# Patient Record
Sex: MALE | Race: OTHER | URBAN - METROPOLITAN AREA
[De-identification: names, ages, dates, MRNs, and addresses within clinical notes are randomized per-mention and may not be internally consistent; named-entity substitution may affect disease eponyms.]

---

## 2023-12-21 ENCOUNTER — OFFICE VISIT (OUTPATIENT)
Dept: ORTHOPEDICS CLINIC | Facility: CLINIC | Age: 53
End: 2023-12-21
Payer: MEDICAID

## 2023-12-21 ENCOUNTER — HOSPITAL ENCOUNTER (OUTPATIENT)
Dept: GENERAL RADIOLOGY | Facility: HOSPITAL | Age: 53
Discharge: HOME | End: 2023-12-21
Attending: ORTHOPAEDIC SURGERY
Payer: MEDICAID

## 2023-12-21 VITALS — BODY MASS INDEX: 30.36 KG/M2 | HEIGHT: 69 IN | WEIGHT: 205 LBS

## 2023-12-21 DIAGNOSIS — M25.512 CHRONIC LEFT SHOULDER PAIN: ICD-10-CM

## 2023-12-21 DIAGNOSIS — M75.02 ADHESIVE CAPSULITIS OF LEFT SHOULDER: Primary | ICD-10-CM

## 2023-12-21 DIAGNOSIS — G89.29 CHRONIC LEFT SHOULDER PAIN: ICD-10-CM

## 2023-12-21 PROCEDURE — 3008F BODY MASS INDEX DOCD: CPT | Performed by: ORTHOPAEDIC SURGERY

## 2023-12-21 PROCEDURE — 99204 OFFICE O/P NEW MOD 45 MIN: CPT | Performed by: ORTHOPAEDIC SURGERY

## 2023-12-21 PROCEDURE — 73030 X-RAY EXAM OF SHOULDER: CPT | Performed by: ORTHOPAEDIC SURGERY

## 2023-12-21 RX ORDER — IBUPROFEN 800 MG/1
800 TABLET ORAL 3 TIMES DAILY
COMMUNITY
Start: 2023-09-12

## 2023-12-21 RX ORDER — METHYLPREDNISOLONE 4 MG/1
TABLET ORAL
Qty: 1 EACH | Refills: 0 | Status: SHIPPED | OUTPATIENT
Start: 2023-12-21

## 2024-03-09 ENCOUNTER — HOSPITAL ENCOUNTER (EMERGENCY)
Facility: HOSPITAL | Age: 54
Discharge: HOME | End: 2024-03-10
Attending: EMERGENCY MEDICINE
Payer: MEDICAID

## 2024-03-09 DIAGNOSIS — K63.89 COLONIC MASS: Primary | ICD-10-CM

## 2024-03-09 DIAGNOSIS — K76.9 LESION OF LIVER: ICD-10-CM

## 2024-03-09 DIAGNOSIS — R10.2 PELVIC PAIN: ICD-10-CM

## 2024-03-09 LAB
ALBUMIN SERPL-MCNC: 4.8 G/DL (ref 3.2–4.8)
ALBUMIN/GLOB SERPL: 1.5 {RATIO} (ref 1–2)
ALP LIVER SERPL-CCNC: 79 U/L
ALT SERPL-CCNC: 29 U/L
ANION GAP SERPL CALC-SCNC: 8 MMOL/L (ref 0–18)
AST SERPL-CCNC: 23 U/L (ref ?–34)
BASOPHILS # BLD AUTO: 0.01 X10(3) UL (ref 0–0.2)
BASOPHILS NFR BLD AUTO: 0.1 %
BILIRUB SERPL-MCNC: 0.3 MG/DL (ref 0.3–1.2)
BUN BLD-MCNC: 13 MG/DL (ref 9–23)
BUN/CREAT SERPL: 12.5 (ref 10–20)
CALCIUM BLD-MCNC: 9.7 MG/DL (ref 8.7–10.4)
CHLORIDE SERPL-SCNC: 108 MMOL/L (ref 98–112)
CO2 SERPL-SCNC: 27 MMOL/L (ref 21–32)
CREAT BLD-MCNC: 1.04 MG/DL
DEPRECATED RDW RBC AUTO: 41.1 FL (ref 35.1–46.3)
EGFRCR SERPLBLD CKD-EPI 2021: 85 ML/MIN/1.73M2 (ref 60–?)
EOSINOPHIL # BLD AUTO: 0.15 X10(3) UL (ref 0–0.7)
EOSINOPHIL NFR BLD AUTO: 1.5 %
ERYTHROCYTE [DISTWIDTH] IN BLOOD BY AUTOMATED COUNT: 11.9 % (ref 11–15)
GLOBULIN PLAS-MCNC: 3.1 G/DL (ref 2.8–4.4)
GLUCOSE BLD-MCNC: 131 MG/DL (ref 70–99)
HCT VFR BLD AUTO: 47.2 %
HGB BLD-MCNC: 16.2 G/DL
IMM GRANULOCYTES # BLD AUTO: 0.03 X10(3) UL (ref 0–1)
IMM GRANULOCYTES NFR BLD: 0.3 %
LIPASE SERPL-CCNC: 53 U/L (ref 13–75)
LYMPHOCYTES # BLD AUTO: 2.34 X10(3) UL (ref 1–4)
LYMPHOCYTES NFR BLD AUTO: 23.6 %
MCH RBC QN AUTO: 31.9 PG (ref 26–34)
MCHC RBC AUTO-ENTMCNC: 34.3 G/DL (ref 31–37)
MCV RBC AUTO: 92.9 FL
MONOCYTES # BLD AUTO: 0.43 X10(3) UL (ref 0.1–1)
MONOCYTES NFR BLD AUTO: 4.3 %
NEUTROPHILS # BLD AUTO: 6.95 X10 (3) UL (ref 1.5–7.7)
NEUTROPHILS # BLD AUTO: 6.95 X10(3) UL (ref 1.5–7.7)
NEUTROPHILS NFR BLD AUTO: 70.2 %
OSMOLALITY SERPL CALC.SUM OF ELEC: 298 MOSM/KG (ref 275–295)
PLATELET # BLD AUTO: 258 10(3)UL (ref 150–450)
POTASSIUM SERPL-SCNC: 4 MMOL/L (ref 3.5–5.1)
PROT SERPL-MCNC: 7.9 G/DL (ref 5.7–8.2)
RBC # BLD AUTO: 5.08 X10(6)UL
SODIUM SERPL-SCNC: 143 MMOL/L (ref 136–145)
WBC # BLD AUTO: 9.9 X10(3) UL (ref 4–11)

## 2024-03-09 PROCEDURE — 80053 COMPREHEN METABOLIC PANEL: CPT | Performed by: EMERGENCY MEDICINE

## 2024-03-09 PROCEDURE — 85025 COMPLETE CBC W/AUTO DIFF WBC: CPT | Performed by: EMERGENCY MEDICINE

## 2024-03-09 PROCEDURE — 96375 TX/PRO/DX INJ NEW DRUG ADDON: CPT

## 2024-03-09 PROCEDURE — 83690 ASSAY OF LIPASE: CPT | Performed by: EMERGENCY MEDICINE

## 2024-03-09 PROCEDURE — 96374 THER/PROPH/DIAG INJ IV PUSH: CPT

## 2024-03-09 PROCEDURE — 99285 EMERGENCY DEPT VISIT HI MDM: CPT

## 2024-03-09 PROCEDURE — 99284 EMERGENCY DEPT VISIT MOD MDM: CPT

## 2024-03-09 RX ORDER — ONDANSETRON 4 MG/1
4 TABLET, ORALLY DISINTEGRATING ORAL ONCE
Status: DISCONTINUED | OUTPATIENT
Start: 2024-03-09 | End: 2024-03-09

## 2024-03-09 RX ORDER — ONDANSETRON 2 MG/ML
4 INJECTION INTRAMUSCULAR; INTRAVENOUS ONCE
Status: COMPLETED | OUTPATIENT
Start: 2024-03-09 | End: 2024-03-09

## 2024-03-09 RX ORDER — MORPHINE SULFATE 4 MG/ML
4 INJECTION, SOLUTION INTRAMUSCULAR; INTRAVENOUS ONCE
Status: COMPLETED | OUTPATIENT
Start: 2024-03-09 | End: 2024-03-09

## 2024-03-10 ENCOUNTER — APPOINTMENT (OUTPATIENT)
Dept: CT IMAGING | Facility: HOSPITAL | Age: 54
End: 2024-03-10
Attending: EMERGENCY MEDICINE
Payer: MEDICAID

## 2024-03-10 VITALS
SYSTOLIC BLOOD PRESSURE: 132 MMHG | WEIGHT: 200 LBS | BODY MASS INDEX: 31.39 KG/M2 | OXYGEN SATURATION: 99 % | HEIGHT: 66.93 IN | RESPIRATION RATE: 20 BRPM | HEART RATE: 74 BPM | TEMPERATURE: 98 F | DIASTOLIC BLOOD PRESSURE: 82 MMHG

## 2024-03-10 PROCEDURE — 74176 CT ABD & PELVIS W/O CONTRAST: CPT | Performed by: EMERGENCY MEDICINE

## 2024-03-10 RX ORDER — HYDROCODONE BITARTRATE AND ACETAMINOPHEN 5; 325 MG/1; MG/1
1 TABLET ORAL ONCE
Status: COMPLETED | OUTPATIENT
Start: 2024-03-10 | End: 2024-03-10

## 2024-03-10 RX ORDER — TRAMADOL HYDROCHLORIDE 50 MG/1
TABLET ORAL EVERY 6 HOURS PRN
Qty: 10 TABLET | Refills: 0 | Status: SHIPPED | OUTPATIENT
Start: 2024-03-10 | End: 2024-03-15

## 2024-03-10 NOTE — ED PROVIDER NOTES
Patient Seen in: [facility] Emergency Department    History     Chief Complaint   Patient presents with    Abdomen/Flank Pain       HPI    53-year-old male presents ER with complaint of suprapubic abdominal pain.  Patient states the pain started approximately 6 PM.  Patient states he had dinner and had 3 beers and started to have nausea as well as suprapubic pain.  Patient also complains of mild left flank pain.    History reviewed. History reviewed. No pertinent past medical history.    History reviewed. History reviewed. No pertinent surgical history.      Medications :  (Not in a hospital admission)       No family history on file.    Smoking Status:   Social History     Socioeconomic History    Marital status:    Tobacco Use    Smoking status: Former     Types: Cigarettes    Smokeless tobacco: Never   Vaping Use    Vaping Use: Never used   Substance and Sexual Activity    Alcohol use: Yes    Drug use: Not Currently       ROS  All pertinent positives for the review of systems are mentioned in the HPI  All other organ systems are reviewed and are negative.    Constitutional and vital signs reviewed.      Social History and Family History elements reviewed from today, pertinent positives to the presenting problem noted.    Physical Exam     ED Triage Vitals [03/09/24 2309]   BP (!) 181/103   Pulse 64   Resp 22   Temp 98.1 °F (36.7 °C)   Temp src Oral   SpO2 97 %   O2 Device None (Room air)       All measures to prevent infection transmission during my interaction with the patient were taken. The patient was already wearing a droplet mask on my arrival to the room. Personal protective equipment including droplet mask, eye protection, and gloves were worn throughout the duration of the exam.  Handwashing was performed prior to and after the exam.  Stethoscope and any equipment used during my examination was cleaned with super sani-cloth germicidal wipes following the exam.     Physical Exam  Vitals and  nursing note reviewed.   Constitutional:       Appearance: He is well-developed.   HENT:      Head: Normocephalic and atraumatic.      Right Ear: External ear normal.      Left Ear: External ear normal.      Nose: Nose normal.   Eyes:      Conjunctiva/sclera: Conjunctivae normal.      Pupils: Pupils are equal, round, and reactive to light.   Cardiovascular:      Rate and Rhythm: Normal rate and regular rhythm.      Heart sounds: Normal heart sounds.   Pulmonary:      Effort: Pulmonary effort is normal.      Breath sounds: Normal breath sounds.   Abdominal:      General: Bowel sounds are normal.      Palpations: Abdomen is soft.      Tenderness: There is abdominal tenderness in the suprapubic area. There is no right CVA tenderness, left CVA tenderness, guarding or rebound.   Musculoskeletal:         General: Normal range of motion.      Cervical back: Normal range of motion and neck supple.   Skin:     General: Skin is warm and dry.   Neurological:      Mental Status: He is alert and oriented to person, place, and time.      Deep Tendon Reflexes: Reflexes are normal and symmetric.   Psychiatric:         Behavior: Behavior normal.         Thought Content: Thought content normal.         Judgment: Judgment normal.         ED Course        Labs Reviewed   COMP METABOLIC PANEL (14) - Abnormal; Notable for the following components:       Result Value    Glucose 131 (*)     Calculated Osmolality 298 (*)     All other components within normal limits   LIPASE - Normal   CBC WITH DIFFERENTIAL WITH PLATELET    Narrative:     The following orders were created for panel order CBC With Differential With Platelet.                  Procedure                               Abnormality         Status                                     ---------                               -----------         ------                                     CBC W/ DIFFERENTIAL[[id number]]                              Final result                                                  Please view results for these tests on the individual orders.   RAINBOW DRAW LAVENDER   RAINBOW DRAW LIGHT GREEN   RAINBOW DRAW BLUE   CBC W/ DIFFERENTIAL         Imaging Results Available and Reviewed while in ED: CT abdomen and pelvis without contrast      IMPRESSION:  Limited unenhanced scan    No acute findings in the abdomen and pelvis    Findings most concerning for malignant lesion of the descending colon  Circumferential apple core configuration lesion noted in this region, perhaps best appreciated on series 6 image 47  Minimal surrounding fat stranding and a few regional nodes which may signify tumoral infiltration and local felipe spread    Additionally there are severalLow-attenuation lesions in the liver raising concern for metastatic disease , series 3 images 11, 15, 19, 24, 30, and 40      No signs of obstruction.  No perforation    ED Medications Administered:   Medications   ondansetron (Zofran) 4 MG/2ML injection 4 mg (4 mg Intravenous Given 3/9/24 2325)   morphINE PF 4 MG/ML injection 4 mg (4 mg Intravenous Given 3/9/24 2325)         MDM     Vitals:    03/09/24 2309 03/10/24 0045   BP: (!) 181/103 131/86   Pulse: 64 70   Resp: 22 18   Temp: 98.1 °F (36.7 °C)    TempSrc: Oral    SpO2: 97% 95%   Weight: 90.7 kg    Height: 170 cm (5' 6.93\")      *I personally reviewed and interpreted all ED vitals.  I also personally reviewed all labs and imaging if ordered    Pulse Ox: 97%, Room air, Normal     Monitor Interpretation:   normal sinus rhythm    Differential Diagnosis/ Diagnostic Considerations: Cystitis, colitis, diverticulitis, appendicitis,    Medical Record Review: I personally reviewed available prior medical records for any recent pertinent discharge summaries, testing, and procedures and reviewed those reports.    Complicating Factors: The patient already has does not have a problem list on file. to contribute to the complexity of this ED evaluation.    Medical Decision  Making  83-year-old male presents ER with complaint of suprapubic abdominal pain.  Patient blood work within normal limits.  Patient given morphine and Zofran states his pain is improved.  Patient's CT abdomen pelvis shows no acute findings in the abdomen pelvis but findings of malignant lesions of the descending colon with possible mets to the liver.  Discussed with patient as well as his wife the need for emergent follow-up with general surgery as well as oncology.  Patient given follow-up with both.  Patient's wife bedside made aware of discharge plan and disposition.  Patient discharged home with tramadol for his pain.    Problems Addressed:  Colonic mass: acute illness or injury  Lesion of liver: acute illness or injury  Pelvic pain: acute illness or injury    Amount and/or Complexity of Data Reviewed  Independent Historian:      Details: Medical history obtained from the wife that states he had a colonoscopy 10 years ago in [place] where it was found he had a polyp that was removed.  Labs: ordered. Decision-making details documented in ED Course.  Radiology: ordered and independent interpretation performed. Decision-making details documented in ED Course.     Details: CT abdomen pelvis interpreted myself shows no clinical sign of bowel obstruction.    Risk  Prescription drug management.        Condition upon leaving the department: Stable    Disposition and Plan     Clinical Impression:  1. Colonic mass    2. Lesion of liver    3. Pelvic pain        Disposition:  Discharge    Follow-up:  Surinder Tsang MD  [address] [facility] [id number] [phone]    Schedule an appointment as soon as possible for a visit  If symptoms worsen    Maddie Barclay MD  [address] [id number] [phone]            Medications Prescribed:  Current Discharge Medication List        START taking these medications    Details   traMADol 50 MG Oral Tab Take 1-2 tablets ( mg total) by mouth  every 6 (six) hours as needed for Pain.  Qty: 10 tablet, Refills: 0    Associated Diagnoses: Colonic mass

## 2024-03-10 NOTE — ED INITIAL ASSESSMENT (HPI)
Pt presents for periumbilical abd pain for the last two hours with vomiting x2. Pt denies diarrhea, denies fevers. Pt reports 3-4 beers today

## 2024-03-13 ENCOUNTER — TELEPHONE (OUTPATIENT)
Dept: HEMATOLOGY/ONCOLOGY | Facility: HOSPITAL | Age: 54
End: 2024-03-13

## 2024-03-13 NOTE — TELEPHONE ENCOUNTER
Spoke with evelina at [facility] to let them know that patient can't be seen here ins is out of network. [place]

## 2024-05-28 ENCOUNTER — HOSPITAL ENCOUNTER (EMERGENCY)
Facility: HOSPITAL | Age: 54
Discharge: ANOTHER HOSPITAL | End: 2024-05-28
Attending: EMERGENCY MEDICINE

## 2024-05-28 ENCOUNTER — APPOINTMENT (OUTPATIENT)
Dept: CT IMAGING | Facility: HOSPITAL | Age: 54
End: 2024-05-28
Attending: EMERGENCY MEDICINE

## 2024-05-28 VITALS
SYSTOLIC BLOOD PRESSURE: 95 MMHG | HEART RATE: 56 BPM | RESPIRATION RATE: 17 BRPM | HEIGHT: 66 IN | DIASTOLIC BLOOD PRESSURE: 63 MMHG | TEMPERATURE: 101 F | WEIGHT: 175 LBS | BODY MASS INDEX: 28.13 KG/M2 | OXYGEN SATURATION: 97 %

## 2024-05-28 DIAGNOSIS — G89.18 POSTOPERATIVE ABDOMINAL PAIN WITH FEVER: Primary | ICD-10-CM

## 2024-05-28 DIAGNOSIS — R10.9 POSTOPERATIVE ABDOMINAL PAIN WITH FEVER: Primary | ICD-10-CM

## 2024-05-28 DIAGNOSIS — R50.82 POSTOPERATIVE ABDOMINAL PAIN WITH FEVER: Primary | ICD-10-CM

## 2024-05-28 LAB
ALBUMIN SERPL-MCNC: 3.7 G/DL (ref 3.2–4.8)
ALBUMIN/GLOB SERPL: 1.1 {RATIO} (ref 1–2)
ALP LIVER SERPL-CCNC: 95 U/L
ALT SERPL-CCNC: 44 U/L
ANION GAP SERPL CALC-SCNC: 6 MMOL/L (ref 0–18)
AST SERPL-CCNC: 29 U/L (ref ?–34)
BASOPHILS # BLD AUTO: 0.02 X10(3) UL (ref 0–0.2)
BASOPHILS NFR BLD AUTO: 0.1 %
BILIRUB SERPL-MCNC: 0.6 MG/DL (ref 0.3–1.2)
BILIRUB UR QL: NEGATIVE
BUN BLD-MCNC: 10 MG/DL (ref 9–23)
BUN/CREAT SERPL: 10.1 (ref 10–20)
CALCIUM BLD-MCNC: 9.2 MG/DL (ref 8.7–10.4)
CHLORIDE SERPL-SCNC: 104 MMOL/L (ref 98–112)
CO2 SERPL-SCNC: 26 MMOL/L (ref 21–32)
COLOR UR: YELLOW
CREAT BLD-MCNC: 0.99 MG/DL
DEPRECATED RDW RBC AUTO: 45.7 FL (ref 35.1–46.3)
EGFRCR SERPLBLD CKD-EPI 2021: 91 ML/MIN/1.73M2 (ref 60–?)
EOSINOPHIL # BLD AUTO: 0.02 X10(3) UL (ref 0–0.7)
EOSINOPHIL NFR BLD AUTO: 0.1 %
ERYTHROCYTE [DISTWIDTH] IN BLOOD BY AUTOMATED COUNT: 13.4 % (ref 11–15)
GLOBULIN PLAS-MCNC: 3.4 G/DL (ref 2–3.5)
GLUCOSE BLD-MCNC: 111 MG/DL (ref 70–99)
GLUCOSE UR-MCNC: NORMAL MG/DL
HCT VFR BLD AUTO: 31.1 %
HGB BLD-MCNC: 10 G/DL
IMM GRANULOCYTES # BLD AUTO: 0.07 X10(3) UL (ref 0–1)
IMM GRANULOCYTES NFR BLD: 0.4 %
KETONES UR-MCNC: NEGATIVE MG/DL
LACTATE SERPL-SCNC: 1.9 MMOL/L (ref 0.5–2)
LEUKOCYTE ESTERASE UR QL STRIP.AUTO: 25
LIPASE SERPL-CCNC: 41 U/L (ref 13–75)
LYMPHOCYTES # BLD AUTO: 1.88 X10(3) UL (ref 1–4)
LYMPHOCYTES NFR BLD AUTO: 11.4 %
MCH RBC QN AUTO: 29.9 PG (ref 26–34)
MCHC RBC AUTO-ENTMCNC: 32.2 G/DL (ref 31–37)
MCV RBC AUTO: 92.8 FL
MONOCYTES # BLD AUTO: 1.03 X10(3) UL (ref 0.1–1)
MONOCYTES NFR BLD AUTO: 6.3 %
NEUTROPHILS # BLD AUTO: 13.45 X10 (3) UL (ref 1.5–7.7)
NEUTROPHILS # BLD AUTO: 13.45 X10(3) UL (ref 1.5–7.7)
NEUTROPHILS NFR BLD AUTO: 81.7 %
NITRITE UR QL STRIP.AUTO: NEGATIVE
OSMOLALITY SERPL CALC.SUM OF ELEC: 282 MOSM/KG (ref 275–295)
PH UR: 5.5 [PH] (ref 5–8)
PLATELET # BLD AUTO: 372 10(3)UL (ref 150–450)
POTASSIUM SERPL-SCNC: 4.3 MMOL/L (ref 3.5–5.1)
PROT SERPL-MCNC: 7.1 G/DL (ref 5.7–8.2)
PROT UR-MCNC: 50 MG/DL
RBC # BLD AUTO: 3.35 X10(6)UL
SODIUM SERPL-SCNC: 136 MMOL/L (ref 136–145)
SP GR UR STRIP: 1.02 (ref 1–1.03)
UROBILINOGEN UR STRIP-ACNC: NORMAL
WBC # BLD AUTO: 16.5 X10(3) UL (ref 4–11)

## 2024-05-28 PROCEDURE — 81001 URINALYSIS AUTO W/SCOPE: CPT | Performed by: EMERGENCY MEDICINE

## 2024-05-28 PROCEDURE — 80053 COMPREHEN METABOLIC PANEL: CPT | Performed by: EMERGENCY MEDICINE

## 2024-05-28 PROCEDURE — 87086 URINE CULTURE/COLONY COUNT: CPT | Performed by: EMERGENCY MEDICINE

## 2024-05-28 PROCEDURE — 87040 BLOOD CULTURE FOR BACTERIA: CPT | Performed by: EMERGENCY MEDICINE

## 2024-05-28 PROCEDURE — 85025 COMPLETE CBC W/AUTO DIFF WBC: CPT | Performed by: EMERGENCY MEDICINE

## 2024-05-28 PROCEDURE — 99291 CRITICAL CARE FIRST HOUR: CPT

## 2024-05-28 PROCEDURE — 96365 THER/PROPH/DIAG IV INF INIT: CPT

## 2024-05-28 PROCEDURE — 74177 CT ABD & PELVIS W/CONTRAST: CPT | Performed by: EMERGENCY MEDICINE

## 2024-05-28 PROCEDURE — 96361 HYDRATE IV INFUSION ADD-ON: CPT

## 2024-05-28 PROCEDURE — 36415 COLL VENOUS BLD VENIPUNCTURE: CPT

## 2024-05-28 PROCEDURE — 83690 ASSAY OF LIPASE: CPT | Performed by: EMERGENCY MEDICINE

## 2024-05-28 PROCEDURE — 83605 ASSAY OF LACTIC ACID: CPT | Performed by: EMERGENCY MEDICINE

## 2024-05-28 PROCEDURE — 96375 TX/PRO/DX INJ NEW DRUG ADDON: CPT

## 2024-05-28 RX ORDER — SODIUM CHLORIDE, SODIUM LACTATE, POTASSIUM CHLORIDE, CALCIUM CHLORIDE 600; 310; 30; 20 MG/100ML; MG/100ML; MG/100ML; MG/100ML
INJECTION, SOLUTION INTRAVENOUS ONCE
Status: COMPLETED | OUTPATIENT
Start: 2024-05-28 | End: 2024-05-28

## 2024-05-28 RX ORDER — SODIUM CHLORIDE 9 MG/ML
125 INJECTION, SOLUTION INTRAVENOUS CONTINUOUS
Status: DISCONTINUED | OUTPATIENT
Start: 2024-05-28 | End: 2024-05-28

## 2024-05-28 RX ORDER — ACETAMINOPHEN 500 MG
1000 TABLET ORAL ONCE
Status: COMPLETED | OUTPATIENT
Start: 2024-05-28 | End: 2024-05-28

## 2024-05-28 RX ORDER — MORPHINE SULFATE 2 MG/ML
2 INJECTION, SOLUTION INTRAMUSCULAR; INTRAVENOUS ONCE
Status: COMPLETED | OUTPATIENT
Start: 2024-05-28 | End: 2024-05-28

## 2024-05-28 NOTE — ED QUICK NOTES
Care transferred to \[facility]\"" at this time. Patient A&Ox4 and appears in no acute distress. Vss. IV site patent and intact. Denies cp/clara. No n/v/d. All belongings to accompany patient at this time.

## 2024-05-28 NOTE — CM/SW NOTE
Called [facility] transfer center at [phone] and spoke with Theresa OLSON regarding request from [facility] for transfer of patient to [facility].    Patient in [facility] ER with abdominal pain and fever and is s/p partial colon resection in April 16, 2024 at [facility].    Theresa OLSON will call EDCM back.    Face Sheet faxed:   [phone].      340pm    Received call back from Alva OLSON at [facility].  Clinical updates and info given to Alva OLSON and she will call back when she was reached out to [facility] physician.

## 2024-05-28 NOTE — ED INITIAL ASSESSMENT (HPI)
Pt presents to the ED with c/o generalized abdominal pain for one week. +chills/hot flashes for one week. Pt reports taking tramadol and getting minimal relief for pain. Pt had colon resectioned four weeks ago.

## 2024-05-28 NOTE — ED PROVIDER NOTES
Patient Seen in: [facility] Emergency Department      History     Chief Complaint   Patient presents with    Abdominal Pain     Stated Complaint: abd pain, Post OP 4 weeks ago    Subjective:   HPI  53 yoM status post laparoscopic sigmoidectomy and left hemicolectomy on 4/22/2024 at [facility] for colonic mass found to be metastatic colon cancer, who presents to the ED for abdominal pain, fever.  Symptoms started over the past 3 to 4 days.  In the 3 weeks following his surgery had been recovering quite well.  His wife reports that he has been taking tramadol for pain without relief.  No blood in the stool.  No diarrhea.  No vomiting.      Objective:   Past Medical History:    Hyperlipidemia              No pertinent past surgical history.              No pertinent social history.            Review of Systems    Positive for stated complaint: abd pain, Post OP 4 weeks ago  Other systems are as noted in HPI.  Constitutional and vital signs reviewed.      All other systems reviewed and negative except as noted above.    Physical Exam     ED Triage Vitals [05/28/24 1154]   BP 98/63   Pulse 89   Resp 18   Temp 99.6 °F (37.6 °C)   Temp src Oral   SpO2 97 %   O2 Device None (Room air)       Current Vitals:   Vital Signs  BP: 96/60  Pulse: 58  Resp: 15  Temp: (!) 100.6 °F (38.1 °C)  Temp src: Rectal  MAP (mmHg): 72    Oxygen Therapy  SpO2: 98 %  O2 Device: None (Room air)            Physical Exam  Vitals and nursing note reviewed.   Constitutional:       Appearance: He is well-developed.   HENT:      Head: Normocephalic and atraumatic.   Eyes:      Extraocular Movements: Extraocular movements intact.   Cardiovascular:      Rate and Rhythm: Normal rate and regular rhythm.      Heart sounds: Normal heart sounds.   Pulmonary:      Effort: Pulmonary effort is normal.      Breath sounds: Normal breath sounds.   Abdominal:      General: There is no distension.      Palpations: Abdomen is soft.       Tenderness: There is generalized abdominal tenderness.   Musculoskeletal:         General: Normal range of motion.      Cervical back: Normal range of motion.   Skin:     General: Skin is warm.      Comments: Surgical incision is clean, dry, intact   Neurological:      Mental Status: He is alert.      Comments: No focal deficits       Differential diagnosis includes but is not limited to anastomotic leak, intra-abdominal abscess, sepsis     ED Course     Labs Reviewed   COMP METABOLIC PANEL (14) - Abnormal; Notable for the following components:       Result Value    Glucose 111 (*)     All other components within normal limits   URINALYSIS WITH CULTURE REFLEX - Abnormal; Notable for the following components:    Clarity Urine Turbid (*)     Blood Urine 2+ (*)     Protein Urine 50 (*)     Leukocyte Esterase Urine 25 (*)     WBC Urine 6-10 (*)     RBC Urine 3-5 (*)     Squamous Epi. Cells Few (*)     All other components within normal limits   CBC W/ DIFFERENTIAL - Abnormal; Notable for the following components:    WBC 16.5 (*)     RBC 3.35 (*)     HGB 10.0 (*)     HCT 31.1 (*)     Neutrophil Absolute Prelim 13.45 (*)     Neutrophil Absolute 13.45 (*)     Monocyte Absolute 1.03 (*)     All other components within normal limits   LIPASE - Normal   LACTIC ACID, PLASMA - Normal   CBC WITH DIFFERENTIAL WITH PLATELET    Narrative:     The following orders were created for panel order CBC With Differential With Platelet.  Procedure                               Abnormality         Status                     ---------                               -----------         ------                     CBC W/ DIFFERENTIAL[[id number]]          Abnormal            Final result                 Please view results for these tests on the individual orders.   RAINBOW DRAW LAVENDER   RAINBOW DRAW LIGHT GREEN   RAINBOW DRAW BLUE   URINE CULTURE, ROUTINE   BLOOD CULTURE   BLOOD CULTURE          ED Course as of 05/28/24  1719  ------------------------------------------------------------  Time: 05/28 1319  Comment: CMP unremarkable  ------------------------------------------------------------  Time: 05/28 1319  Comment: CBC with leukocytosis and anemia  ------------------------------------------------------------  Time: 05/28 1319  Comment: UA with microscopic hematuria  ------------------------------------------------------------  Time: 05/28 1443  Comment: Lactic acid 1.9     CT ABDOMEN+PELVIS(CONTRAST ONLY)(CPT=74177)    Result Date: 5/28/2024  CONCLUSION:   History of recent partial sigmoid resection for colon cancer.  Large amount of surrounding inflammatory change localizing near the anastomosis, with partial faint peripheral enhancing rim, but which is not entirely well-defined, measuring 10.6 cm in maximal dimension.  No definite macroscopic perforation identified on imaging.  There is outside CT from 04/24/2024 describing evolving hematoma at this location.  Correlate clinically to assess for superimposed infection evolving postoperative hematoma.  Diffuse urinary bladder wall thickening with surrounding fat stranding.  Finding is concerning for cystitis.  Correlate with urinalysis.  Otherwise, appearance could be reactive related to aforementioned and inflammatory process.  Numerous low-density lesions throughout the liver measuring up to 2.0 cm. These are concerning for hepatic metastatic disease.  There is report of outside hepatic biopsy.  Correlate with pathology results.  Severe prostatomegaly.   Trace bilateral pleural effusions with dependent atelectasis.    Dictated by (CST): Barbara Kilgore MD on 5/28/2024 at 3:21 PM     Finalized by (CST): Barbara Kilgore MD on 5/28/2024 at 3:36 PM                  MDM      I spent a total of 50 minutes of critical care time in obtaining history, performing a physical exam, bedside monitoring of interventions, collecting and interpreting tests and discussion with consultants but  not including time spent performing procedures.                            [facility]  part of [facility]      Sepsis Reassessment Note    /63   Pulse 82   Temp 99.6 °F (37.6 °C) (Oral)   Resp 16   Ht 167.6 cm (5' 6\")   Wt 79.4 kg   SpO2 95%   BMI 28.25 kg/m²      I completed the sepsis reassessment at 1718    Cardiac:  Regularity: Regular  Rate: Normal  Heart Sounds: S1,S2    Lungs:   Right: Clear  Left: Clear    Peripheral Pulses:  Radial: Right 1+ or Left 1+      Capillary Refill:  <3 Secs    Skin:  Temp/Moisture: Warm and Dry  Color: Normal      Dorys Madrid MD  5/28/2024  12:53 PM            Medical Decision Making    On arrival to the ED patient is febrile to 104 Fahrenheit.  Labs reviewed as above.  From independent interpretation of CT ab pelvis there is concern for intra-abdominal abscess versus anastomotic leak versus hematoma.  IV Zosyn administered.  I spoke with  Ana who kindly help facilitate transport to [facility] for continuity of care.  Accepting physician Dr. aDve.      Problems Addressed:  Postoperative abdominal pain with fever: complicated acute illness or injury with systemic symptoms    Amount and/or Complexity of Data Reviewed  External Data Reviewed: notes.     Details:     I reviewed clinic visit by Dr. Carmen on 5/7/2024 which documents patient's postoperative course was complicated by retroperitoneal hematoma, anemia requiring 1 unit PRBC transfusion on postop day 2 and 3    Labs: ordered. Decision-making details documented in ED Course.  Radiology: ordered and independent interpretation performed. Decision-making details documented in ED Course.  Discussion of management or test interpretation with external provider(s): As above    Risk  Decision regarding hospitalization.        Disposition and Plan     Clinical Impression:  1. Postoperative abdominal pain with fever         Disposition:  Transfer to another facility  5/28/2024   4:32 pm    Follow-up:  No follow-up provider specified.        Medications Prescribed:  Current Discharge Medication List

## 2024-05-28 NOTE — CM/SW NOTE
Received call from Alva OLSON at [facility] and she stated that patient has been accepted for transfer.    Accepting MD:  Dr. Siva Dave - colorectal surgeon    [address] - [address]    Receiving RN:  [phone]    [facility] are sending [facility] transportation to  patient.    ETA 30 mins (approx 510pm)    PCS form completed in Epic.    CD copy burned and given to BEHZAD.    [facility] stated that she did not speak with Dr. Dave colorectal surgeon at [facility] and EDCM confirmed with Alva OLSON at [facility] transfer center that Dr. Dave colorectal surgeon at [facility] stated that he does NOT need to speak with ERMD prior to transfer.  ERMD updated